# Patient Record
Sex: MALE | Race: WHITE | NOT HISPANIC OR LATINO
[De-identification: names, ages, dates, MRNs, and addresses within clinical notes are randomized per-mention and may not be internally consistent; named-entity substitution may affect disease eponyms.]

---

## 2021-10-22 PROBLEM — Z00.00 ENCOUNTER FOR PREVENTIVE HEALTH EXAMINATION: Status: ACTIVE | Noted: 2021-10-22

## 2021-10-25 ENCOUNTER — NON-APPOINTMENT (OUTPATIENT)
Age: 26
End: 2021-10-25

## 2021-10-25 ENCOUNTER — TRANSCRIPTION ENCOUNTER (OUTPATIENT)
Age: 26
End: 2021-10-25

## 2021-10-25 ENCOUNTER — APPOINTMENT (OUTPATIENT)
Dept: COLORECTAL SURGERY | Facility: CLINIC | Age: 26
End: 2021-10-25
Payer: MEDICAID

## 2021-10-25 VITALS
DIASTOLIC BLOOD PRESSURE: 87 MMHG | SYSTOLIC BLOOD PRESSURE: 127 MMHG | HEART RATE: 80 BPM | TEMPERATURE: 98.4 F | BODY MASS INDEX: 25.77 KG/M2 | HEIGHT: 69 IN | WEIGHT: 174 LBS

## 2021-10-25 DIAGNOSIS — K62.5 HEMORRHAGE OF ANUS AND RECTUM: ICD-10-CM

## 2021-10-25 DIAGNOSIS — K62.0 ANAL POLYP: ICD-10-CM

## 2021-10-25 PROCEDURE — 99203 OFFICE O/P NEW LOW 30 MIN: CPT | Mod: 25

## 2021-10-25 PROCEDURE — 46600 DIAGNOSTIC ANOSCOPY SPX: CPT

## 2021-10-25 RX ORDER — CHROMIUM 200 MCG
TABLET ORAL
Refills: 0 | Status: ACTIVE | COMMUNITY

## 2021-10-25 RX ORDER — MESALAMINE 375 MG/1
0.38 CAPSULE, EXTENDED RELEASE ORAL
Refills: 0 | Status: ACTIVE | COMMUNITY

## 2021-10-25 RX ORDER — FERROUS SULFATE 325(65) MG
325 (65 FE) TABLET ORAL
Refills: 0 | Status: ACTIVE | COMMUNITY

## 2021-10-25 NOTE — ASSESSMENT
[FreeTextEntry1] : I reviewed with the patient and his wife that the findings on examination are consistent with proctitis/ulcerative colitis versus potential Crohn's disease with associated rectal polyp.  Recommend continued medical therapy for underlying proctitis.  In addition I have outlined the need for polypectomy for definitive diagnosis including a potential Underlying adenomatous changes.\par \par The risks and befits of the treatment plan were reviewed and outlined with the patient. The patient understands the associated risks of the involved treatment and wishes to proceed. All questions were answered and explained.\par \par

## 2021-10-25 NOTE — PHYSICAL EXAM
[Excoriation] : no perianal excoriation [Tight] : was tight [None] : there was no rectal mass  [FreeTextEntry1] : Medical assistant was present for the entire exam.\par \par Anoscopy was performed for evaluation of the patients rectal bleeding  history .\par The risks, benefits and alternatives were reviewed.\par \par A lighted anoscope was passed into the anal canal and the entire anal mucosal surface was inspected..  \par The findings revealed moderate distal rectal mucosal edema and friability with prolapsing left lateral base polypoid lesion.  Lesion measuring approximately 2 x 1-1/2 cm\par

## 2021-10-25 NOTE — HISTORY OF PRESENT ILLNESS
[FreeTextEntry1] : 27 yo M presents for evaluation of rectal polyp, referred by GI [facility]\par PSH left inguinal hernia repair (9-10 years ago)\par \par Pt underwent colonoscopy on 10/21/21 for evaluation of rectal bleeding which has occurred w/ most BMs for the last 3 years. \par \par Findings:\par Polyp noted in anorectal area\par Int hemorrhoids\par Multiple superficial erosions, biopsy taken, pending pathology\par Plan: \par CRS consult, IBD panel and pt started on mesalamine 4 tabs daily\par \par c/o BRB noted on TP, stool and in toilet bowl w/ most BMs. Also reports mucus on stools\par h/o diarrhea x 1 week approx 5 weeks ago, diagnosed w/ campylobacter. Reports low appetite at this time and some weight loss. Pt admits he benefitted from losing weight and weight has since been stable.\par Also diagnosed w/ anemia and PCP started on supplementation two weeks ago\par Denies abdominal pain, itching or burning\par BH: twice daily\par Denies constipation, diarrhea or straining\par Taking iron supplementation for the last two weeks\par Denies stool softeners, fiber supplements or laxatives\par Admits could improve dietary fiber intake. Drinks adequate water\par Denies FMH of IBD or colorectal cancer\par Denies  ASA/NSAIDs last 7 days

## 2021-11-17 ENCOUNTER — TRANSCRIPTION ENCOUNTER (OUTPATIENT)
Age: 26
End: 2021-11-17

## 2021-11-18 ENCOUNTER — RESULT REVIEW (OUTPATIENT)
Age: 26
End: 2021-11-18

## 2021-11-18 ENCOUNTER — APPOINTMENT (OUTPATIENT)
Dept: COLORECTAL SURGERY | Facility: HOSPITAL | Age: 26
End: 2021-11-18

## 2021-11-18 ENCOUNTER — OUTPATIENT (OUTPATIENT)
Dept: OUTPATIENT SERVICES | Facility: HOSPITAL | Age: 26
LOS: 1 days | Discharge: HOME | End: 2021-11-18
Payer: MEDICAID

## 2021-11-18 PROCEDURE — 88305 TISSUE EXAM BY PATHOLOGIST: CPT | Mod: 26

## 2021-11-18 PROCEDURE — 45171 EXC RECT TUM TRANSANAL PART: CPT | Mod: GC

## 2021-11-18 RX ORDER — OXYCODONE HYDROCHLORIDE 5 MG/1
1 TABLET ORAL
Qty: 8 | Refills: 0
Start: 2021-11-18

## 2021-11-18 NOTE — BRIEF OPERATIVE NOTE - OPERATION/FINDINGS
Exam under anesthesia revealing inflamed friable rectal mucosa. Posterior 6cm from the AV pedunculated polyp. Polyp excised and oversewn with vicryl for hemostasis. Separate rectal mucosa biopsy obtained.

## 2021-11-19 ENCOUNTER — NON-APPOINTMENT (OUTPATIENT)
Age: 26
End: 2021-11-19

## 2021-11-19 LAB — SURGICAL PATHOLOGY STUDY: SIGNIFICANT CHANGE UP
